# Patient Record
Sex: FEMALE | Race: OTHER | NOT HISPANIC OR LATINO | Employment: UNEMPLOYED | URBAN - METROPOLITAN AREA
[De-identification: names, ages, dates, MRNs, and addresses within clinical notes are randomized per-mention and may not be internally consistent; named-entity substitution may affect disease eponyms.]

---

## 2024-09-17 LAB
EXTERNAL ABO GROUPING: NORMAL
EXTERNAL ANTIBODY SCREEN: NORMAL
EXTERNAL CHLAMYDIA SCREEN: NORMAL
EXTERNAL GONORRHEA SCREEN: NORMAL
EXTERNAL HEMATOCRIT: 37 %
EXTERNAL HEMOGLOBIN: 12 G/DL
EXTERNAL HEPATITIS B SURFACE ANTIGEN: NORMAL
EXTERNAL HIV-1 P24 ANTIGEN: NORMAL
EXTERNAL PLATELET COUNT: 304 K/ΜL
EXTERNAL RH FACTOR: POSITIVE
EXTERNAL RUBELLA IGG QUANTITATION: NORMAL

## 2024-09-19 ENCOUNTER — HOSPITAL ENCOUNTER (OUTPATIENT)
Dept: RADIOLOGY | Facility: HOSPITAL | Age: 44
Discharge: HOME | End: 2024-09-19
Attending: OBSTETRICS & GYNECOLOGY
Payer: COMMERCIAL

## 2024-09-19 ENCOUNTER — NURSE TRIAGE (OUTPATIENT)
Age: 44
End: 2024-09-19

## 2024-09-19 ENCOUNTER — TELEPHONE (OUTPATIENT)
Dept: OBGYN CLINIC | Facility: MEDICAL CENTER | Age: 44
End: 2024-09-19

## 2024-09-19 DIAGNOSIS — N92.6 MISSED MENSES: ICD-10-CM

## 2024-09-19 DIAGNOSIS — N92.6 MISSED MENSES: Primary | ICD-10-CM

## 2024-09-19 PROCEDURE — 76801 OB US < 14 WKS SINGLE FETUS: CPT

## 2024-09-19 NOTE — TELEPHONE ENCOUNTER
Exact LMP unknown, possibly 6/22/2024- Saw Adama HARRELL OB on 8/28/2024 wth + HPT end of July.  Per patient FHB was detected with u/s but u/s imaging not completed.  She was scheduled for this week.    Per estimated 6/22/2-24 LMP currently 12w5d.  Nausea /vomiting has been improving. Had been provided with vit b6/unisom.     Warm transfer to [facility] for D/V scan appt. Soonest available in book it 10/18/2024.

## 2024-09-19 NOTE — TELEPHONE ENCOUNTER
Regarding: new patient from [place] with vomiting and unsure of LMP  ----- Message from Harmony BALL sent at 9/19/2024  3:50 PM EDT -----  New patient from [place] was seeing Adama LÓPEZ is having vomiting and is not sure of her LMP. She would like to be scheduled with Dr. Villegas at either [place] or [facility].

## 2024-09-19 NOTE — TELEPHONE ENCOUNTER
Patient is new to [facility] just moved here from [place]. Patient called for a dating and viability. Patient believes her LMP is 6/22. Might be earlier than that. She was seen by her old GYN prior to moving on 8/28 and they did not do an ultrasound that day but picked up heartbeat on a doppler. Told patient she was further along than 9 weeks at that point probably closer to 11-12 weeks. Nothing available in the office in the next day or week. Placed stat Ultrasound for radiology to be done. Advised patient we will call her as soon as we get the results here in the office. Patient was understanding on this and okay with going to radiology just wanted to know how far along she is and to get care.

## 2024-09-20 NOTE — RESULT ENCOUNTER NOTE
Please inform patient US reviewed  and showing  live pregnancy at  14 weeks - needs OB intake scheduled  ASAP

## 2024-09-24 ENCOUNTER — TELEPHONE (OUTPATIENT)
Dept: OBGYN CLINIC | Facility: CLINIC | Age: 44
End: 2024-09-24

## 2024-09-24 NOTE — TELEPHONE ENCOUNTER
----- Message from Letty Guerrero MD sent at 9/19/2024  8:10 PM EDT -----  Please inform patient US reviewed  and showing  live pregnancy at  14 weeks - needs OB intake scheduled  ASAP

## 2024-10-02 ENCOUNTER — TELEPHONE (OUTPATIENT)
Age: 44
End: 2024-10-02

## 2024-10-02 ENCOUNTER — DOCUMENTATION (OUTPATIENT)
Age: 44
End: 2024-10-02

## 2024-10-02 ENCOUNTER — INITIAL PRENATAL (OUTPATIENT)
Dept: OBGYN CLINIC | Facility: MEDICAL CENTER | Age: 44
End: 2024-10-02

## 2024-10-02 VITALS
HEIGHT: 65 IN | SYSTOLIC BLOOD PRESSURE: 92 MMHG | BODY MASS INDEX: 35.85 KG/M2 | WEIGHT: 215.2 LBS | DIASTOLIC BLOOD PRESSURE: 50 MMHG

## 2024-10-02 DIAGNOSIS — Z83.2 FAMILY HISTORY OF CLOTTING DISORDER: ICD-10-CM

## 2024-10-02 DIAGNOSIS — O09.292 TRISOMY 18 IN CHILD OF PRIOR PREGNANCY, CURRENTLY PREGNANT IN SECOND TRIMESTER: ICD-10-CM

## 2024-10-02 DIAGNOSIS — Z34.92 SECOND TRIMESTER PREGNANCY: Primary | ICD-10-CM

## 2024-10-02 PROCEDURE — NOBC

## 2024-10-02 RX ORDER — DIPHENHYDRAMINE HYDROCHLORIDE 25 MG/1
25 CAPSULE ORAL DAILY
COMMUNITY

## 2024-10-02 NOTE — PROGRESS NOTES
OB INTAKE INTERVIEW 2024    Patient is 43 y.o. who presents for OB intake at 16w0d.  She is accompanied by  her son Ivy  during this encounter.  The father of her baby (Gideon) is involved in the pregnancy.      Patient's last menstrual period was 2024 (approximate).  Ultrasound: Measured 14 weeks 1 days on 2024  Estimated Date of Delivery: 3/19/25 changed by dating ultrasound.    Signs/Symptoms of Pregnancy  Current pregnancy symptoms: nausea  Constipation no  Headaches yes - occasional  Cramping/spotting no  PICA cravings no    Diabetes-  Body mass index is 35.81 kg/m².  If patient has 1 or more, please order early 1 hour GTT  History of GDM no  BMI >35 YES  History of PCOS or current metformin use no  History of LGA/macrosomic infant (4000g/9lbs) no    If patient has 2 or more, please order early 1 hour GTT  BMI>30 YES  AMA YES  First degree relative with type 2 diabetes no  History of chronic HTN, hyperlipidemia, elevated A1C YES - elevated A1C  High risk race (, , ,  or ) no    Hypertension- if you answer yes to any of the following, please order baseline preeclampsia labs (cbc, comprehensive metabolic panel, urine protein creatinine ratio, uric acid)  History of of chronic HTN no  History of gestational HTN no  History of preeclampsia, eclampsia, or HELLP syndrome no  History of diabetes no  History of lupus, autoimmune disease, kidney disease no    Thyroid- if yes order TSH with reflex T4  History of thyroid disease no    Bleeding Disorder or Hx of DVT-patient or first degree relative with history of. Order the following if not done previously.   (Factor V, antithrombin III, prothrombin gene mutation, protein C and S Ag, lupus anticoagulant, anticardiolipin, beta-2 glycoprotein)   YES - both sisters with clotting disorders.    OB/GYN-  History of abnormal pap smear no       Date of last pap smear 2024  History of  HPV no  History of Herpes/HSV no  History of other STI (gonorrhea, chlamydia, trich) no  History of prior  YES  History of prior  no  History of  delivery prior to 36 weeks 6 days no  History of blood transfusion no  Ok for blood transfusion YES    Substance screening-   History of tobacco use no  Currently using tobacco no  Currently using alcohol no  Presently using drugs no  Past drug use  no  IV drug use- no  Partner drug use no  Parent/Family drug use no  Substance Use Screen Level - no risk    MRSA Screening-   Does the pt have a hx of MRSA? no    Immunizations:  Influenza vaccine given this season NO   Discussed Tdap vaccine YES   COVID Vaccine YES x2    Genetic/[facility]-  Do you or your partner have a history of any of the following in yourselves or first degree relatives?  Cystic fibrosis no  Spinal muscular atrophy no  Hemoglobinopathy/Sickle Cell/Thalassemia no  Fragile X Intellectual Disability no    Education provided. Will meet with genetic counselor and further discuss.    Appointment for anatomy scan at [facility] has not been scheduled.  Referral provided today.    Interview education  [facility] Pregnancy Essentials Book reviewed, discussed and attached to their AVS YES     Prenatal lab work scripts - completed at [place]  Extra labs ordered: Hgb Fractionation Cascade, 1 hour GTT, and bleeding disorder panel    Aspirin/Preeclampsia Screen    Risk Level Risk Factor Recommendation   LOW Prior Uncomplicated full-term delivery YES No Aspirin recommendation        MODERATE Nulliparity no Recommend low-dose aspirin if     BMI>30 YES 2 or more moderate risk factors    Family History Preeclampsia (mother/sister) no     35yr old or greater YES      or Low Socioeconomic YES     IVF Pregnancy  no     Personal History Risks (low birth weight, prior adverse preg outcome, >10yr preg interval) YES - h/o baby born with trisomy 18. Passed away after 2 months of birth.         HIGH History of  Preeclampsia no Recommend low-dose aspirin if     Multifetal gestation no 1 or more high risk factors    Chronic HTN no     Type 1 or 2 Diabetes no     Renal Disease no     Autoimmune Disease  no      Contraindications to ASA therapy:  NSAID/ ASA allergy: no  Nasal polyps: no  Asthma with history of ASA induced bronchospasm: no  Relative contraindications:  History of GI bleed: no  Active peptic ulcer disease: no  Severe hepatic dysfunction: no    Patient does meet recommendation to take ASA 162mg during this pregnancy from 12-36wks to lower her risk of preeclampsia.  Instructions given and patient verbalized understanding.    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    Details that I feel the provider should be aware of: Jennifer came in for her OB intake at 16 weeks. Patient is a transfer from [facility]. Patient c/o nausea and occasional headaches. Safe medications to take during pregnancy and warning signs reviewed. Patient with h/o  demise in 2017 due to trisomy 18. [facility] referral placed for early anatomy and genetic counselor given prior history. Patient given number to call and schedule visit. High priority message sent to [facility] to assist with scheduling. Prenatal panel completed with prior OB. Patient reports had NIPT completed and resulted low-risk. Early 1 hour glucose ordered. Patient has 2 sisters with blood clotting disorders. Panel ordered. Hgb fractionation cascade ordered.  HT obtained today. HT- 152.     PN1 visit scheduled 10/11. The patient was oriented to our practice, the navigator role, reviewed delivering physicians and [facility] for delivery. All questions were answered.    Interviewed by: Junie Burroughs RN

## 2024-10-02 NOTE — PATIENT INSTRUCTIONS
Congratulations on your pregnancy!  We thank you for allowing us to participate in your care.    NEXT STEPS    Go to the lab to have your prenatal bloodwork completed if you have not already done so.  There is a listing of [facility] and locations in your prenatal folder. You may also visit [email or web address] or call [phone].   Please be aware that some insurance companies may require you to go to a specific lab (ex. [employer] or [employer]). You can verify this by contacting your insurance company.   Please have your blood work completed prior to you next prenatal visit.    If you have decided to have genetic testing done at [facility], that will be scheduled by [facility]. You may have already scheduled this appointment.  If not, please call their office to schedule this appointment.  Based on the referral placed by our office, they will know how to schedule you appropriately.    Contact information for Maternal Fetal Medicine is located in your prenatal folder. The main phone number to their office is [phone].    Return to our office for your first routine prenatal visit.   [facility] has multiple locations. Always check the address of your appointment to ensure you are going to the correct office.       Warning Signs During Pregnancy - If you experience any problems or concerns, call the office directly.  The list below includes warning signs your providers would like you to be aware of.  If you experience any of these at any time during your pregnancy, please call us as soon as possible.   Vaginal bleeding  Sharp abdominal pain that does not go away  Fever (more than 100.4?F and is not relieved with Tylenol)  Persistent vomiting lasting greater than 24 hours  Chest pain/Shortness of breath  Pain or burning when you urinate     Call the OFFICE [phone] for any questions/emergencies.  At night or on the weekend, calls go through a triage service, please indicate it is an emergency and  the DOCTOR on call will be paged.    Remember to only use [facility] for none urgent concerns or questions.    Our doctors deliver at [facility] in [place]. The address is provided below.     [address] [address]    Please click on the link below to review our Pregnancy Essential Guide.    [facility] Pregnancy Essentials Guide  [facility] ([email or web address])     Click on the link below to review [facility] Lab locations.  [facility] Lab Locations    iodine resource  [employer] is a tool to connect you to community resources you may need.

## 2024-10-02 NOTE — TELEPHONE ENCOUNTER
I saw this very sweet patient for her OB intake today. She is currently 16 weeks pregnant. She is a transfer from [facility]. Patient has history of child born with trisomy 18 in 2017 who passed away after 2 months of being born. Can we help her set up an appointment with genetic counseling and an ultrasound as soon as possible? Patient is AMA, increased BMI and h/o elevated A1C. Patient has 2 sisters with h/o blood clotting disorders. Panel ordered today. Encouraged to complete 1 hour GTT. Prenatal panel was already completed with [facility]. We are working on getting records from them. Thank you! Junie Burroughs

## 2024-10-02 NOTE — PROGRESS NOTES
Spoke to patient regarding her early anatomy appt.I made the appt for 10/08/24 @8am in the [facility]

## 2024-10-07 PROBLEM — O99.210 OBESITY IN PREGNANCY, ANTEPARTUM: Status: ACTIVE | Noted: 2024-10-07

## 2024-10-07 PROBLEM — O09.522 MULTIGRAVIDA OF ADVANCED MATERNAL AGE IN SECOND TRIMESTER: Status: ACTIVE | Noted: 2024-10-07

## 2024-10-07 NOTE — PATIENT INSTRUCTIONS
Thank you for choosing us for your  care today.  If you have any questions about your ultrasound or care, please do not hesitate to contact us or your primary obstetrician.        Some general instructions for your pregnancy are:    Exercise: Aim for 22 minutes per day (150 minutes per week) of regular exercise.  Walking is great!  Nutrition: Choose healthy sources of calcium, iron, and protein.  Learn about Preeclampsia: preeclampsia is a common, potentially serious high blood pressure complication in pregnancy.  A blood pressure of 140mmHg (systolic or top number) or 90mmHg (diastolic or bottom number) should be evaluated by your doctor.  Aspirin is sometimes prescribed in early pregnancy to prevent preeclampsia in women with risk factors - ask your obstetrician if you should be on this medication.  For more resources, visit:  https://www.highriskpregnancyinfo.org/preeclampsia  If you smoke, please try to quit completely but also try to reduce your smoking by as much as possible (as soon as possible).  Do not vape.  Please also avoid cannabis products.  Other warning signs to watch out for in pregnancy or postpartum: chest pain, obstructed breathing or shortness of breath, seizures, thoughts of hurting yourself or your baby, bleeding, a painful or swollen leg, fever, or headache (see [facility] POST-BIRTH Warning Signs campaign).  If these happen call 911.  Itching is also not normal in pregnancy and if you experience this, especially over your hands and feet, potentially worse at night, notify your doctors.     The use of low dose aspirin in pregnancy (162mg) is recommended in women with an increased risk for preeclampsia, and was recommended today for you.  When started early in pregnancy (ideally 12-16 weeks but can be started as late as 28 weeks), low dose aspirin can reduce your risk of preeclampsia.  Low dose aspirin has been shown to be safe and without significant maternal or fetal risk.  Side effects  are generally none to mild, however, if you experience what you think may be an allergic reaction after starting aspirin, immediately stop it and notify your doctor.  If you have asthma or acid reflux and notice an increase in symptom frequency or severity, consider stopping this medication, and notify your doctor.  If you have had bariatric surgery, you may need a different dose of medication with or without acid-reducing medication.  We advise routine discontinuation of this medication at 36 weeks.  For more resources, visit:  https://mothertobaby.org/fact-sheets/low-dose-aspirin/  https://www.preeclampsia.org/aspirin  https://www.highriskpregnancyinfo.org/preeclampsia

## 2024-10-08 ENCOUNTER — ROUTINE PRENATAL (OUTPATIENT)
Dept: PERINATAL CARE | Facility: OTHER | Age: 44
End: 2024-10-08
Payer: COMMERCIAL

## 2024-10-08 VITALS
SYSTOLIC BLOOD PRESSURE: 118 MMHG | DIASTOLIC BLOOD PRESSURE: 68 MMHG | HEART RATE: 96 BPM | BODY MASS INDEX: 36.39 KG/M2 | WEIGHT: 218.4 LBS | HEIGHT: 65 IN

## 2024-10-08 DIAGNOSIS — Z3A.16 16 WEEKS GESTATION OF PREGNANCY: ICD-10-CM

## 2024-10-08 DIAGNOSIS — Z83.2 FAMILY HISTORY OF CLOTTING DISORDER: ICD-10-CM

## 2024-10-08 DIAGNOSIS — Z36.3 ENCOUNTER FOR ANTENATAL SCREENING FOR MALFORMATIONS: ICD-10-CM

## 2024-10-08 DIAGNOSIS — O99.210 OBESITY IN PREGNANCY, ANTEPARTUM: ICD-10-CM

## 2024-10-08 DIAGNOSIS — O09.522 MULTIGRAVIDA OF ADVANCED MATERNAL AGE IN SECOND TRIMESTER: Primary | ICD-10-CM

## 2024-10-08 DIAGNOSIS — O09.292 TRISOMY 18 IN CHILD OF PRIOR PREGNANCY, CURRENTLY PREGNANT IN SECOND TRIMESTER: ICD-10-CM

## 2024-10-08 PROBLEM — Z3A.17 17 WEEKS GESTATION OF PREGNANCY: Status: ACTIVE | Noted: 2024-10-08

## 2024-10-08 PROCEDURE — 99243 OFF/OP CNSLTJ NEW/EST LOW 30: CPT | Performed by: NURSE PRACTITIONER

## 2024-10-08 PROCEDURE — 76805 OB US >/= 14 WKS SNGL FETUS: CPT | Performed by: OBSTETRICS & GYNECOLOGY

## 2024-10-08 RX ORDER — ASPIRIN 81 MG/1
162 TABLET ORAL DAILY
Qty: 180 TABLET | Refills: 3 | Status: SHIPPED | OUTPATIENT
Start: 2024-10-08

## 2024-10-08 NOTE — PROGRESS NOTES
Initial Prenatal Visit  [facility] [address], [place], [place]    Assessment/Plan:  Jennifer is a 44 y.o. year old  at 16w6d who presents for initial prenatal visit.     Supervision of normal pregnancy  - Prenatal labs reviewed and normal.  Blood type: b  - Aneuploidy screening discussed.  Patient opts for cell free DNA testing.  - Routine cervical cancer screening: Pap Up to date.  - Routine STI Screening: GC/Chlamydia sent today.  HIV/Hep B/Syphilis ordered in prenatal panel.  - Patient Education: Patient was counseled regarding diet, exercise, weight gain, foods to avoid, vaccines in pregnancy, aneuploidy screening, travel precautions to include seat belt use and VTE risk reduction.  She has been provided our pregnancy packet which includes how and when to contact providers, medication recommendations, dietary suggestions, breastfeeding information as well as websites for additional information, hospital and delivery concerns.       Additional Pregnancy Problems:   1. 17 weeks gestation of pregnancy  Assessment & Plan:  Saw M on 10/7/24  Orders:  -     Alpha fetoprotein, maternal; Future  2. Family history of clotting disorder  Assessment & Plan:  Sisters with clotting disorder and awaiting the results of the labs.   3. Multigravida of advanced maternal age in second trimester  4. Obesity in pregnancy, antepartum  Assessment & Plan:  Starting BMI is 37  Early GTT to be done   Asa till 36 week  Testing starting at 36 weeks    Baseline labs.   5. Trisomy 18 in child of prior pregnancy, currently pregnant in second trimester  Assessment & Plan:  Recommend genetic testing.         Subjective:   CC:  Desires prenatal care  Jennifer Hoskins is a 44 y.o.  female who presents for prenatal care.  Pregnancy ROS:  leakage of fluid, pelvic pain, or vaginal bleeding.   nausea/vomiting.    The following portions of the patient's history were reviewed and updated as  appropriate: allergies, current medications, past family history, past medical history, obstetric history, gynecologic history, past social history, past surgical history and problem list.      Objective:  /60   Wt 97.7 kg (215 lb 4.8 oz)   LMP 2024 (Approximate)   BMI 35.83 kg/m²   Pregravid Weight/BMI: 102 kg (224 lb) (BMI 37.28)  Current Weight: 97.7 kg (215 lb 4.8 oz)   Total Weight Gain: -3.946 kg (-8 lb 11.2 oz)   Pre- Vitals      Flowsheet Row Most Recent Value   Prenatal Assessment    Fetal Heart Rate 156   Prenatal Vitals    Blood Pressure 120/60   Weight - Scale 97.7 kg (215 lb 4.8 oz)   Urine Albumin/Glucose    Dilation/Effacement/Station    Vaginal Drainage    Draining Fluid No   Edema    LLE Edema None   RLE Edema None           General: Well appearing, no distress  Respiratory: Normal respiratory rate, lungs clear to auscultation, no wheezing or rales  Cardiovascular: Regular rate and rhythm, no murmurs, rubs, or gallops  Breasts: Normal bilaterally, nontender without masses, asymmetry, or nipple discharge.  Abdomen: Soft, gravid, nontender  : Urethra normal. Normal labia majora and minora. Vagina normal.  No vaginal bleeding. No vaginal discharge. Cervix visually closed.   Extremities: Warm and well perfused.  Non tender.  No edema.

## 2024-10-08 NOTE — PROGRESS NOTES
OFFICE CONSULT      Dear Dr. Guerrero,    Thank you for requesting a  consultation on your patient Jennifer Marin  for the following indications: Early anatomic survey    History  Medications: Vitamin B6, prenatal vitamins and Unisom  Allergies to medications: No known drug allergies  Past medical history: Pregravid BMI of 37.28  Past surgical history: Lerna tooth extraction  Past obstetrical history: .    # 1/ 1/15- term vaginal delivery of a male  weighing 6 pounds 2 ounces.  She denies pregnancy complications.  #2 -term vaginal delivery of a male  weighing 4 pounds with trisomy 18 and a  demise.  # 3 2018 spontaneous miscarriage that did not require surgical intervention  # 4   spontaneous miscarriage that did require surgical intervention  Social history: Denies current use of alcohol, tobacco or drugs of abuse  First generation family history: Sisters with blood clots-no testing     Ultrasound findings:   A viable wells intrauterine pregnancy is seen on today's ultrasound, measuring consistent with established EDC.  The fetal anatomic survey is incomplete today, with suboptimal/incomplete views as indicated above.  However, no fetal anomalies are suspected on today's survey. Amniotic fluid and placenta are within normal limits.      She does not report any vaginal bleeding or uterine cramping or contractions.      Specific counseling was provided on the following problems:  1. We reviewed her low risk M 21 which she had on her phone for review. She declines diagnostic testing.       2. Advanced maternal age (AMA): AMA over 40 years old has risks associated with pregnancy outside of the increased risk of aneuploidy. These risks include: an increased risk of gestational diabetes (up to 8.5% in women over 40), hypertensive disorders in pregnancy including preeclampsia, increased risk of  birth, increased risk of IUGR (up to 17.4% in women over  40) as well as an increased risk of macrosomia (up to 15% in women over 40) and post-partum complications.  - Initiation of fetal monitoring with weekly NST/AFIs between 32 and 36 weeks  - We reviewed in detail the recommendation for delivery by ISSAC.     3.  Body mass index > 30 kg/m2 is associated with an increased risk of several pregnancy complications, including hypertensive disorders, diabetes, abnormal fetal growth, fetal malformations. The risk of  delivery is also increased, as are wound complications in the event of  delivery. A healthy diet and exercise, as well as appropriate gestational weight gain (no more than 11-20 pounds) can help reduce risk of these complications. 150 minutes of moderate exercise per week is recommended for all pregnant women. Nutrition counseling is also available if desired.  Early screening for gestational diabetes is recommended (ordered), as well as routine re-screening at 24-28 weeks if early screening results are normal.  fetal surveillance is also recommended as follows:    4.  The use of low dose aspirin in pregnancy (162mg) is recommended in women with a high risk, or multiple moderate risk factors for preeclampsia.  Aspirin therapy should be initiated between 12-28 weeks gestation, and is most effective if started prior to 16 weeks gestation, and stopped by 36 weeks gestation. Low dose aspirin in pregnancy has been shown to reduce the incidence of preeclampsia in women with risk factors, and has been shown to be safe and without significant maternal or fetal risk. In light of her risk factors (advanced maternal age and pregravid BMI of 37), I recommend initiating aspirin therapy, which was prescribed today.    5.  We reviewed current recommendations regarding  Flu, COVID and RSV (third trimester) vaccines by the American College of Obstetricians and Gynecologists and the Society for Maternal-Fetal Medicine. We discussed reassuring pregnancy  outcome information after vaccination. We discussed the increased severity of infections and the resultant maternal and fetal complications that can arise with a severe infection including  labor.  Vaccines have been found to generate  antibodies in pregnant and lactating women similar to that observed in non-pregnant women. Vaccine-induced antibody levels were significantly greater than the levels found in response to natural infection. Immune transfer of these antibodies to neonates is found to occur via the placenta and breast milk.    6.  We discussed her family history of blood clots.  An inherited thrombophilia panel is ordered. An autoimmune thrombophilia work up is not indicated. Her 2 prior miscarriages were after the age of 37 and her  demise was due to trisomy 18.    Future tests recommended:  An MSAFP screen at 16-18 weeks to screen for spina bifida.     Future ultrasounds ordered today:   Anatomic survey 21 weeks       Split-shared decision-making between ANABELL Castro and myself was utilized, with the majority of the time spent by JERSON Castro.  Medical decision-making for this encounter was moderate (diagnosis moderate, data moderate and risk moderate).    I reviewed the ultrasound pictures and recommended the medical decision making transcribed in the care of this patient.      Mary Kate Bauer MD

## 2024-10-08 NOTE — ASSESSMENT & PLAN NOTE
Starting BMI is 37  Early GTT to be done   Asa till 36 week  Testing starting at 36 weeks    Baseline labs are normal

## 2024-10-08 NOTE — LETTER
2024     Letty Guerrero MD  [address] [place] [address]  [address] [place] [address]    Patient: Jennifer Hoskins   YOB: 1980   Date of Visit: 10/8/2024       Dear Dr. Guerrero:    Thank you for referring Jennifer Hoskins to me for evaluation. Below are my notes for this consultation.    If you have questions, please do not hesitate to call me. I look forward to following your patient along with you.         Sincerely,        Mary Kate Bauer MD        CC: No Recipients    Mary Kate Bauer MD  10/8/2024  1:09 PM  Sign when Signing Visit  OFFICE CONSULT      Dear Dr. Guerrero,    Thank you for requesting a  consultation on your patient Jennifer Marin  for the following indications: Early anatomic survey    History  Medications: Vitamin B6, prenatal vitamins and Unisom  Allergies to medications: No known drug allergies  Past medical history: Pregravid BMI of 37.28  Past surgical history: [place] tooth extraction  Past obstetrical history: .    # 1/ 1/15- term vaginal delivery of a male  weighing 6 pounds 2 ounces.  She denies pregnancy complications.  #2 -term vaginal delivery of a male  weighing 4 pounds with trisomy 18 and a  demise.  # 3 2018 spontaneous miscarriage that did not require surgical intervention  # 4   spontaneous miscarriage that did require surgical intervention  Social history: Denies current use of alcohol, tobacco or drugs of abuse  First generation family history: Sisters with blood clots-no testing     Ultrasound findings:   A viable wells intrauterine pregnancy is seen on today's ultrasound, measuring consistent with established EDC.  The fetal anatomic survey is incomplete today, with suboptimal/incomplete views as indicated above.  However, no fetal anomalies are suspected on today's survey. Amniotic fluid and placenta are within normal limits.      She does not report any vaginal bleeding or  uterine cramping or contractions.      Specific counseling was provided on the following problems:  1. We reviewed her low risk M 21 which she had on her phone for review. She declines diagnostic testing.       2. Advanced maternal age (AMA): AMA over 40 years old has risks associated with pregnancy outside of the increased risk of aneuploidy. These risks include: an increased risk of gestational diabetes (up to 8.5% in women over 40), hypertensive disorders in pregnancy including preeclampsia, increased risk of  birth, increased risk of IUGR (up to 17.4% in women over 40) as well as an increased risk of macrosomia (up to 15% in women over 40) and post-partum complications.  - Initiation of fetal monitoring with weekly NST/AFIs between 32 and 36 weeks  - We reviewed in detail the recommendation for delivery by ISSAC.     3.  Body mass index > 30 kg/m2 is associated with an increased risk of several pregnancy complications, including hypertensive disorders, diabetes, abnormal fetal growth, fetal malformations. The risk of  delivery is also increased, as are wound complications in the event of  delivery. A healthy diet and exercise, as well as appropriate gestational weight gain (no more than 11-20 pounds) can help reduce risk of these complications. 150 minutes of moderate exercise per week is recommended for all pregnant women. Nutrition counseling is also available if desired.  Early screening for gestational diabetes is recommended (ordered), as well as routine re-screening at 24-28 weeks if early screening results are normal.  fetal surveillance is also recommended as follows:    4.  The use of low dose aspirin in pregnancy (162mg) is recommended in women with a high risk, or multiple moderate risk factors for preeclampsia.  Aspirin therapy should be initiated between 12-28 weeks gestation, and is most effective if started prior to 16 weeks gestation, and stopped by 36 weeks  gestation. Low dose aspirin in pregnancy has been shown to reduce the incidence of preeclampsia in women with risk factors, and has been shown to be safe and without significant maternal or fetal risk. In light of her risk factors (advanced maternal age and pregravid BMI of 37), I recommend initiating aspirin therapy, which was prescribed today.    5.  We reviewed current recommendations regarding  Flu, COVID and RSV (third trimester) vaccines by the American College of Obstetricians and Gynecologists and the Society for Maternal-Fetal Medicine. We discussed reassuring pregnancy outcome information after vaccination. We discussed the increased severity of infections and the resultant maternal and fetal complications that can arise with a severe infection including  labor.  Vaccines have been found to generate  antibodies in pregnant and lactating women similar to that observed in non-pregnant women. Vaccine-induced antibody levels were significantly greater than the levels found in response to natural infection. Immune transfer of these antibodies to neonates is found to occur via the placenta and breast milk.    6.  We discussed her family history of blood clots.  An inherited thrombophilia panel is ordered. An autoimmune thrombophilia work up is not indicated. Her 2 prior miscarriages were after the age of 37 and her  demise was due to trisomy 18.    Future tests recommended:  An MSAFP screen at 16-18 weeks to screen for spina bifida.     Future ultrasounds ordered today:   Anatomic survey 21 weeks       Split-shared decision-making between ANABELL Castro and myself was utilized, with the majority of the time spent by JERSON Castro.  Medical decision-making for this encounter was moderate (diagnosis moderate, data moderate and risk moderate).    I reviewed the ultrasound pictures and recommended the medical decision making transcribed in the care of this patient.      Mary Kate Bauer MD

## 2024-10-11 ENCOUNTER — INITIAL PRENATAL (OUTPATIENT)
Dept: OBGYN CLINIC | Facility: MEDICAL CENTER | Age: 44
End: 2024-10-11

## 2024-10-11 VITALS — DIASTOLIC BLOOD PRESSURE: 60 MMHG | SYSTOLIC BLOOD PRESSURE: 120 MMHG | WEIGHT: 215.3 LBS | BODY MASS INDEX: 35.83 KG/M2

## 2024-10-11 DIAGNOSIS — Z83.2 FAMILY HISTORY OF CLOTTING DISORDER: ICD-10-CM

## 2024-10-11 DIAGNOSIS — O99.210 OBESITY IN PREGNANCY, ANTEPARTUM: ICD-10-CM

## 2024-10-11 DIAGNOSIS — O09.292 TRISOMY 18 IN CHILD OF PRIOR PREGNANCY, CURRENTLY PREGNANT IN SECOND TRIMESTER: ICD-10-CM

## 2024-10-11 DIAGNOSIS — O09.522 MULTIGRAVIDA OF ADVANCED MATERNAL AGE IN SECOND TRIMESTER: ICD-10-CM

## 2024-10-11 DIAGNOSIS — Z3A.17 17 WEEKS GESTATION OF PREGNANCY: Primary | ICD-10-CM

## 2024-10-11 PROCEDURE — PNV: Performed by: OBSTETRICS & GYNECOLOGY

## 2024-10-25 ENCOUNTER — APPOINTMENT (OUTPATIENT)
Dept: LAB | Facility: MEDICAL CENTER | Age: 44
End: 2024-10-25
Payer: COMMERCIAL

## 2024-10-25 DIAGNOSIS — Z34.92 SECOND TRIMESTER PREGNANCY: ICD-10-CM

## 2024-10-25 DIAGNOSIS — Z83.2 FAMILY HISTORY OF CLOTTING DISORDER: ICD-10-CM

## 2024-10-25 DIAGNOSIS — Z3A.17 17 WEEKS GESTATION OF PREGNANCY: ICD-10-CM

## 2024-10-25 LAB
GLUCOSE 1H P 50 G GLC PO SERPL-MCNC: 109 MG/DL (ref 70–134)
HCV AB SER QL: NORMAL

## 2024-10-25 PROCEDURE — 82105 ALPHA-FETOPROTEIN SERUM: CPT

## 2024-10-25 PROCEDURE — 86803 HEPATITIS C AB TEST: CPT

## 2024-10-25 PROCEDURE — 36415 COLL VENOUS BLD VENIPUNCTURE: CPT

## 2024-10-25 PROCEDURE — 82950 GLUCOSE TEST: CPT

## 2024-10-25 PROCEDURE — 83020 HEMOGLOBIN ELECTROPHORESIS: CPT

## 2024-10-27 LAB
2ND TRIMESTER 4 SCREEN SERPL-IMP: NORMAL
AFP ADJ MOM SERPL: 0.92
AFP INTERP AMN-IMP: NORMAL
AFP INTERP SERPL-IMP: NORMAL
AFP INTERP SERPL-IMP: NORMAL
AFP SERPL-MCNC: 37.5 NG/ML
AGE AT DELIVERY: 42.4 YR
GA METHOD: NORMAL
GA: 19.3 WEEKS
IDDM PATIENT QL: NO
MULTIPLE PREGNANCY: NO
NEURAL TUBE DEFECT RISK FETUS: NORMAL %

## 2024-10-28 LAB
HGB A MFR BLD: 3.2 % (ref 1.8–3.2)
HGB A MFR BLD: 96.8 % (ref 96.4–98.8)
HGB F MFR BLD: 0 % (ref 0–2)
HGB FRACT BLD-IMP: NORMAL
HGB S MFR BLD: 0 %